# Patient Record
Sex: FEMALE
[De-identification: names, ages, dates, MRNs, and addresses within clinical notes are randomized per-mention and may not be internally consistent; named-entity substitution may affect disease eponyms.]

---

## 2023-02-07 PROBLEM — Z00.00 ENCOUNTER FOR PREVENTIVE HEALTH EXAMINATION: Status: ACTIVE | Noted: 2023-02-07

## 2023-02-08 ENCOUNTER — APPOINTMENT (OUTPATIENT)
Dept: OTOLARYNGOLOGY | Facility: CLINIC | Age: 53
End: 2023-02-08
Payer: COMMERCIAL

## 2023-02-08 VITALS
SYSTOLIC BLOOD PRESSURE: 125 MMHG | WEIGHT: 132 LBS | DIASTOLIC BLOOD PRESSURE: 77 MMHG | HEIGHT: 65 IN | OXYGEN SATURATION: 98 % | TEMPERATURE: 98 F | RESPIRATION RATE: 16 BRPM | BODY MASS INDEX: 21.99 KG/M2 | HEART RATE: 60 BPM

## 2023-02-08 DIAGNOSIS — Z86.39 PERSONAL HISTORY OF OTHER ENDOCRINE, NUTRITIONAL AND METABOLIC DISEASE: ICD-10-CM

## 2023-02-08 DIAGNOSIS — Z78.9 OTHER SPECIFIED HEALTH STATUS: ICD-10-CM

## 2023-02-08 DIAGNOSIS — H91.90 UNSPECIFIED HEARING LOSS, UNSPECIFIED EAR: ICD-10-CM

## 2023-02-08 PROCEDURE — 99204 OFFICE O/P NEW MOD 45 MIN: CPT | Mod: 25

## 2023-02-08 PROCEDURE — 69210 REMOVE IMPACTED EAR WAX UNI: CPT

## 2023-02-08 PROCEDURE — 92557 COMPREHENSIVE HEARING TEST: CPT

## 2023-02-08 PROCEDURE — 92550 TYMPANOMETRY & REFLEX THRESH: CPT | Mod: 52

## 2023-02-08 NOTE — PHYSICAL EXAM
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

## 2023-02-08 NOTE — DATA REVIEWED
[de-identified] : outside  on 19 showed l hf snhl, r mid to high frequency snhl -results reviewed with pt \par outside  on 23 showed left snhl, low frequencies decreased when compared with 19, r snhl- stable when compared with 19 -results reviewed with pt \par  today showed r hf snhl, l low frequency and high frequency snhl- stable when compared with 23 -results reviewed with pt

## 2023-02-08 NOTE — HISTORY OF PRESENT ILLNESS
[de-identified] : 53 y/o F presenting with a fullness in her right ear, fluctuations in hearing in her right ear, referred by Dr Marie. She has h/o Hashimoto's hypothyroidism. 3 years ago she noticed a discrepancy between right and left ears. She saw an otolaryngologist and had a  and was told she had a discrepancy between both ears. She had an MRI and was told it was nl in 2019 (she showed us report on computer- report unremarkable). She had an episode of dizziness for 30 minutes associated with high-pitched noise in her right ear, and r aural fullness, and her right hearing dropped for 30 minutes and then returned to baseline. She had shingles vaccine approximately 19 days ago and was ill for 48 hours afterward- she was nauseated and dizzy. Throughout the day she has instances of intermittent fullness in her right ear, and fluctuations in hearing in her right ear. Prior to fluctuations in hearing she hears a high pitched tone. She also experiences episodes of dizziness when she exercises and lies on her back. She does not notice a change in hearing in her left ear. She had COVID in 2022. She denies recent uri, COVID, flu. She denies tick bite or recent Lyme's Disease that she is aware of. No fh or sh relevant to cc.

## 2023-02-08 NOTE — REASON FOR VISIT
[Initial Evaluation] : an initial evaluation for [FreeTextEntry2] : r aural fulness, fluctuations in hearing, tinnitus

## 2023-02-08 NOTE — PROCEDURE
[Cerumen Impaction] : Cerumen Impaction [Same] : same as the Pre Op Dx. [] : Removal of Cerumen [FreeTextEntry5] : r nl, l copious cerumen removed atraumatically with suction, b TMs nl

## 2023-02-08 NOTE — ASSESSMENT
[FreeTextEntry1] : 1. r fullness in ear, tinnitus, fluctuation in r hearing, l sudden hearing loss r hf snhl, l lf and hf snhl possibly d/t Meniere's Disease\par -outside  on 19 showed l hf snhl, r mid to high frequency snhl -results reviewed with pt \par -outside  on 23 showed left snhl, low frequencies decreased when compared with 19, r snhl- stable when compared with 19 -results reviewed with pt \par - today showed r hf snhl, l low frequency and high frequency snhl- stable when compared with 23 -results reviewed with pt \par -lo sodium diet- 1500 mg, advised to stay well hydrated in case this is Meniere's Disease\par -maxzide  tab \par -LEWIS, HSP 70, Lymes, CBC, CMP, dsDNA, RF, sed rate, FTA will call pt with results \par -holding off on steroids (pt had shingles vaccine 19 days ago an could blunt her immune response) and she does not notice left hearing loss\par -she had MRI in 2019- recommended repeating it \par 2. l cerumen impaction \par -cerumen removed\par -ears felt better\par RTC in 12-14 days with MRI --> consider steroids at next visit \par \par

## 2023-02-09 LAB
ALBUMIN SERPL ELPH-MCNC: 4.4 G/DL
ALP BLD-CCNC: 33 U/L
ALT SERPL-CCNC: 17 U/L
ANION GAP SERPL CALC-SCNC: 11 MMOL/L
AST SERPL-CCNC: 20 U/L
B BURGDOR AB SER-IMP: NEGATIVE
B BURGDOR IGG+IGM SER QL: 0.21 INDEX
BASOPHILS # BLD AUTO: 0.06 K/UL
BASOPHILS NFR BLD AUTO: 0.8 %
BILIRUB SERPL-MCNC: 0.3 MG/DL
BUN SERPL-MCNC: 15 MG/DL
CALCIUM SERPL-MCNC: 9.4 MG/DL
CHLORIDE SERPL-SCNC: 103 MMOL/L
CO2 SERPL-SCNC: 24 MMOL/L
CREAT SERPL-MCNC: 1.04 MG/DL
DSDNA AB SER-ACNC: <12 IU/ML
EGFR: 65 ML/MIN/1.73M2
EOSINOPHIL # BLD AUTO: 0.13 K/UL
EOSINOPHIL NFR BLD AUTO: 1.8 %
ERYTHROCYTE [SEDIMENTATION RATE] IN BLOOD BY WESTERGREN METHOD: 6 MM/HR
GLUCOSE SERPL-MCNC: 95 MG/DL
HCT VFR BLD CALC: 41.1 %
HGB BLD-MCNC: 13.2 G/DL
IMM GRANULOCYTES NFR BLD AUTO: 0.3 %
LYMPHOCYTES # BLD AUTO: 2.12 K/UL
LYMPHOCYTES NFR BLD AUTO: 29.6 %
MAN DIFF?: NORMAL
MCHC RBC-ENTMCNC: 28.8 PG
MCHC RBC-ENTMCNC: 32.1 GM/DL
MCV RBC AUTO: 89.5 FL
MONOCYTES # BLD AUTO: 0.43 K/UL
MONOCYTES NFR BLD AUTO: 6 %
NEUTROPHILS # BLD AUTO: 4.4 K/UL
NEUTROPHILS NFR BLD AUTO: 61.5 %
PLATELET # BLD AUTO: 208 K/UL
POTASSIUM SERPL-SCNC: 4 MMOL/L
PROT SERPL-MCNC: 6.7 G/DL
RBC # BLD: 4.59 M/UL
RBC # FLD: 12.8 %
RHEUMATOID FACT SER QL: <10 IU/ML
SODIUM SERPL-SCNC: 138 MMOL/L
T PALLIDUM AB SER QL IA: NEGATIVE
WBC # FLD AUTO: 7.16 K/UL

## 2023-02-15 ENCOUNTER — NON-APPOINTMENT (OUTPATIENT)
Age: 53
End: 2023-02-15

## 2023-02-15 LAB
ANA PAT FLD IF-IMP: NORMAL
ANA SER IF-ACNC: ABNORMAL

## 2023-02-17 ENCOUNTER — NON-APPOINTMENT (OUTPATIENT)
Age: 53
End: 2023-02-17

## 2023-02-17 LAB — INNER EAR 68KD AB FLD QL: NEGATIVE

## 2023-02-23 ENCOUNTER — APPOINTMENT (OUTPATIENT)
Dept: OTOLARYNGOLOGY | Facility: CLINIC | Age: 53
End: 2023-02-23
Payer: COMMERCIAL

## 2023-02-23 VITALS
HEART RATE: 68 BPM | OXYGEN SATURATION: 100 % | HEIGHT: 65 IN | WEIGHT: 132 LBS | DIASTOLIC BLOOD PRESSURE: 75 MMHG | BODY MASS INDEX: 21.99 KG/M2 | SYSTOLIC BLOOD PRESSURE: 123 MMHG | RESPIRATION RATE: 16 BRPM | TEMPERATURE: 97 F

## 2023-02-23 PROCEDURE — 92550 TYMPANOMETRY & REFLEX THRESH: CPT | Mod: 52

## 2023-02-23 PROCEDURE — 99214 OFFICE O/P EST MOD 30 MIN: CPT

## 2023-02-23 PROCEDURE — 92557 COMPREHENSIVE HEARING TEST: CPT

## 2023-02-23 NOTE — DATA REVIEWED
[de-identified] :  showed b snhl- r WR decreased from 100% to 62%, l mid frequencies decreased when compared with 23 -results reviewed with pt  [de-identified] : bloodwork revealed abnl LEWIS, otherwise unremarkable -results reviewed with pt

## 2023-02-23 NOTE — REASON FOR VISIT
[Subsequent Evaluation] : a subsequent evaluation for [FreeTextEntry2] : r aural fullness, fluctuations in hearing, tinnitus

## 2023-02-23 NOTE — ASSESSMENT
[FreeTextEntry1] : r fullness in ear, tinnitus, fluctuation in r hearing, l sudden hearing loss possibly d/t b Meniere's Disease vs aied\par -bloodwork revealed abnl LEWIS, otherwise unremarkable -results reviewed with pt \par -discussed with Dr. Garcia- she offered to see her and asked patient to call her office tomorrow- she agreed\par -continue lo sodium diet- 1500 mg, hydration\par -continue maxzide 25 1 tab\par -open stand up MRI scheduled for  - recommended she have MRI sooner with a sedative; pt admits to claustrophobia- istop was checked, no prescriptions, 5 mg diazepam prescribed and recommended patient have someone accompany her to conventional  MRI- she agreed\par - showed b snhl- r WR decreased from 100% to 62%, l mid frequencies decreased when compared with 23 -results reviewed with pt \par -discussed risks and benefits and alts to po and b IT dexamethasone injxn- pt declined IT injxns and wished to try po steroids first- explained we would recommend both but she prefers to try po steroids only; she understood the faster it is treated, the better the chance of improvement in hearing\par -pt denies HTN, DM, PUD, infxn, depression/anxiety \par -prednisone\par -omeprazole \par RTC in 1 week with  and MRI or sooner for any issues\par

## 2023-02-23 NOTE — HISTORY OF PRESENT ILLNESS
[de-identified] : 15 day followup visit for this 53 y/o F with a fullness in her right ear, fluctuations in hearing in her right ear for several years, which are worse in the past month. Since last visit she has been following a lo sodium diet- 1500 mg and is on maxzide 1/2 tab. She had blood work which revealed a positive LEWIS. We recommended she see Dr. Garcia and has reached out to their office to set up an appointment. Since last visit she denies any episodes of dizziness. She still has fluctuations in r hearing and fullness in her right ear, but states instances are less frequent. At last visit she was found to have r hf snhl, and l lf snhl. She had shingles vaccine approximately 1 month ago. Patient is set up for open stand up MRI at the end of March.

## 2023-03-01 ENCOUNTER — APPOINTMENT (OUTPATIENT)
Dept: OTOLARYNGOLOGY | Facility: CLINIC | Age: 53
End: 2023-03-01
Payer: COMMERCIAL

## 2023-03-01 VITALS
BODY MASS INDEX: 21.99 KG/M2 | HEART RATE: 78 BPM | DIASTOLIC BLOOD PRESSURE: 74 MMHG | HEIGHT: 65 IN | WEIGHT: 132 LBS | SYSTOLIC BLOOD PRESSURE: 114 MMHG | OXYGEN SATURATION: 98 % | TEMPERATURE: 98 F

## 2023-03-01 PROCEDURE — 92550 TYMPANOMETRY & REFLEX THRESH: CPT | Mod: 52

## 2023-03-01 PROCEDURE — 92557 COMPREHENSIVE HEARING TEST: CPT

## 2023-03-01 PROCEDURE — 99214 OFFICE O/P EST MOD 30 MIN: CPT

## 2023-03-01 NOTE — REASON FOR VISIT
[Subsequent Evaluation] : a subsequent evaluation for [FreeTextEntry2] : b aural fullness, fluctuations in hearing

## 2023-03-01 NOTE — DATA REVIEWED
[de-identified] :  showed r snhl-  decreased  mid [facility] [place] compared with 23, l low frequency snhl- decreased when compared with 23, WR- R-64%, L-100% -results reviewed with pt  [de-identified] : MRI showed chronic T2 white matter changes, developmental venous anomaly, otherwise unremarkable -results reviewed with pt

## 2023-03-01 NOTE — HISTORY OF PRESENT ILLNESS
[de-identified] : 6 day followup visit for this 51 y/o F with r aural fullness, fluctuations in hearing in her right ear for several years, which are worse in the past month. She is following a low sodium diet and is on maxzide 1/2 tab daily. She is on oral prednisone and omeprazole. She is here to recheck her hearing and MRI. Approximately 5 days ago she noticed a fullness in her left ear and fluctuations in l hearing. When she had fullness in left ear she was laying on couch, was nauseated, and was dizzy. She reports the room was spinning and it lasted a few hours and she needed to sleep it off.

## 2023-03-01 NOTE — ASSESSMENT
[FreeTextEntry1] : b aural fullness, fluctuations in hearing, tinnitus likely d/t b Meniere's Disease vs aied\par - showed r snhl- slightly decreased when compared with 23, l low frequency snhl- decreased when compared with 23, WR- R-64%, L-100% -results reviewed with pt \par -MRI showed chronic T2 white matter changes, developmental venous anomaly, otherwise unremarkable -results reviewed with pt \par -continue lo sodium diet- 1500 mg, hydration\par -continue diuretic \par -finish po steroids and omeprazole \par -discussed risks and benefits and alts to b IT dexamethasone injxns- she said she is flying tomorrow, recommended she postpone trip- she said she could not and would come back in 5 days- she understood the faster it is treated, the better the chance of improvement in hearing\par -she has appointment in mid March with Dr. Garcia\par RTC in 5 days when she is back in town

## 2023-03-06 ENCOUNTER — APPOINTMENT (OUTPATIENT)
Dept: OTOLARYNGOLOGY | Facility: CLINIC | Age: 53
End: 2023-03-06
Payer: COMMERCIAL

## 2023-03-06 PROCEDURE — 99214 OFFICE O/P EST MOD 30 MIN: CPT | Mod: 25

## 2023-03-06 PROCEDURE — 69801 INCISE INNER EAR: CPT | Mod: 50

## 2023-03-06 PROCEDURE — 92557 COMPREHENSIVE HEARING TEST: CPT

## 2023-03-06 PROCEDURE — 92550 TYMPANOMETRY & REFLEX THRESH: CPT | Mod: 52

## 2023-03-06 NOTE — PROCEDURE
[Risk and Benefits Discussed] : The purpose, risks, discomforts, benefits and alternatives of the procedure have been explained to the patient including no treatment. [[facility] [[facility]den [facility] Loss] : Sudden Hearing Loss [Same] : same as the Pre Op Dx. [] : Intratympanic Therapy [FreeTextEntry4] : phenol [FreeTextEntry6] : 0.3 cc dex injected AU atraumatically IT under microscope - lay down 20 min after each sequential injx

## 2023-03-06 NOTE — HISTORY OF PRESENT ILLNESS
[de-identified] : 5 day followup visit for this 51 y/o F with r aural fullness, fluctuations in hearing in her right ear for years, which are worse in the past month. She is following a lo sodium diet and is on maxzide daily. She also has had sudden hl in left ear recently. She feels her r ear fluctuates more than the left does. She is here to recheck her hearing and consider steroids injection. . She finished a course of po steroids without improvement. She had ocean like tinnitus 3 nights ago and has had intermittent noise.

## 2023-03-06 NOTE — ASSESSMENT
[FreeTextEntry1] : b aural fullness, fluctuations in hearing, sudden tinnitus likely d/t b Meniere's Disease vs aied\par -s/p po steroids and omeprazole - did not have significant improvement- r worsened\par -continue lo sodium diet- 1500 mg, hydration\par -continue diuretic \par discussed risks benefits and alts to B  IT dexamethasone injx\par she wished to proceed\par done\par dep\par drops 2d\par has appt to see Dr Garcia in [place] next week\par rtc 1 week with alan

## 2023-03-13 ENCOUNTER — APPOINTMENT (OUTPATIENT)
Dept: OTOLARYNGOLOGY | Facility: CLINIC | Age: 53
End: 2023-03-13
Payer: COMMERCIAL

## 2023-03-13 VITALS
RESPIRATION RATE: 16 BRPM | HEIGHT: 65 IN | WEIGHT: 130 LBS | DIASTOLIC BLOOD PRESSURE: 77 MMHG | HEART RATE: 67 BPM | BODY MASS INDEX: 21.66 KG/M2 | OXYGEN SATURATION: 100 % | SYSTOLIC BLOOD PRESSURE: 114 MMHG | TEMPERATURE: 98 F

## 2023-03-13 DIAGNOSIS — H93.8X1 OTHER SPECIFIED DISORDERS OF RIGHT EAR: ICD-10-CM

## 2023-03-13 PROCEDURE — 92550 TYMPANOMETRY & REFLEX THRESH: CPT

## 2023-03-13 PROCEDURE — 69801 INCISE INNER EAR: CPT | Mod: 50

## 2023-03-13 PROCEDURE — 92557 COMPREHENSIVE HEARING TEST: CPT

## 2023-03-13 PROCEDURE — 99213 OFFICE O/P EST LOW 20 MIN: CPT | Mod: 25

## 2023-03-13 NOTE — REASON FOR VISIT
[Subsequent Evaluation] : a subsequent evaluation for [FreeTextEntry2] : b aural fullness, fluctuations in hearing, vertigo, b md vs aied

## 2023-03-13 NOTE — HISTORY OF PRESENT ILLNESS
[de-identified] : 1 week followup visit for this 52 y/o F with r aural fullness, fluctuations in hearing in her right ear which are worse for the past month. She is following a lo sodium diet and is on maxzide daily. She has had sudden fluctuations in left ear. She was treated with po steroids and had b intratympanic dexamethasone injections. She is here to recheck her hearing. Since last visit she has ocean like tinnitus in her left ear which began 1-2 d after injx. She does not notice any changes in hearing. She is c/o dizziness for the past five days, and four days ago was dizzy and nauseated to the point where she felt she could not walk. She is currently mildly unsteady

## 2023-03-13 NOTE — PROCEDURE
[Risk and Benefits Discussed] : The purpose, risks, discomforts, benefits and alternatives of the procedure have been explained to the patient including no treatment. [[facility] [Sudden Hearing Loss] : Sudden Hearing Loss [Same] : same as the Pre Op Dx. [] : Intratympanic Therapy [FreeTextEntry4] : phenol  [FreeTextEntry5] : 0.3 cc Dexamethasone injected bilaterally IT atraumatically under microscope

## 2023-03-13 NOTE — ASSESSMENT
[FreeTextEntry1] : b aural fullness, fluctuations in hearing, sudden tinnitus likely d/t b Meniere's Disease vs aied\par - showed r hf snhl, l lf snhl- stable when compared with 3/6/23 - results reviewed with pt\par -she has followup with Dr. aGrcia in 2 days\par -s/p po steroids and omeprazole\par -continue lo sodium diet- 1500 mg, hydration\par -continue diuretic\par -discussed risks, benefits, and alternatives to another round of b IT dexamethasone injxns- she has had one round thus far\par -she wished to proceed\par -done\par -dep\par -ofloxacin drops x 2 days\par -recommended vestibular therapy- given script and provided list of facilities \par -she has upcoming flight Saturday- recommend she postpones this trip and asked her to come in the day before- she agreed\par RTC in 5 days with

## 2023-03-13 NOTE — PHYSICAL EXAM
[de-identified] : r tm healed, l monomer  [Normal] : no nystagmus [de-identified] : gait steady

## 2023-03-13 NOTE — DATA REVIEWED
[de-identified] :  showed r hf snhl, l lf snhl- stable when compared with 3/6/23 - results reviewed with pt

## 2023-03-17 ENCOUNTER — APPOINTMENT (OUTPATIENT)
Dept: OTOLARYNGOLOGY | Facility: CLINIC | Age: 53
End: 2023-03-17
Payer: COMMERCIAL

## 2023-03-17 VITALS
SYSTOLIC BLOOD PRESSURE: 122 MMHG | DIASTOLIC BLOOD PRESSURE: 77 MMHG | RESPIRATION RATE: 16 BRPM | HEIGHT: 65 IN | HEART RATE: 65 BPM | WEIGHT: 130 LBS | OXYGEN SATURATION: 100 % | TEMPERATURE: 98.2 F | BODY MASS INDEX: 21.66 KG/M2

## 2023-03-17 PROCEDURE — 69801 INCISE INNER EAR: CPT | Mod: 50

## 2023-03-17 PROCEDURE — 92557 COMPREHENSIVE HEARING TEST: CPT

## 2023-03-17 PROCEDURE — 99214 OFFICE O/P EST MOD 30 MIN: CPT | Mod: 25

## 2023-03-18 NOTE — ASSESSMENT
[FreeTextEntry1] : b aural fullness, fluctuations in hearing, sudden tinnitus is likely d/t b Meniere's Disease vs aied\par - showed b snhl- improvement in b pure tone, WR increased from 80% to 90% in r, l 100% -results reviewed with pt \par -s/p po steroids and omeprazole\par -continue to followup with Dr. Garcia; they are awaiting decision on rituximab from insurance\par -continue lo sodium diet-1500 mg, hydration\par -continue diuretic, colchicine, anakinra\par -pt is scheduled for vestibular rehab\par -discussed risks and benefits and alts to  b IT dexamethasone injxns- pt has had two on each side thus far\par -pt wished to proceed\par -done\par -dep\par -ofloxacin 2 days\par RTC in 5 days with rpt  or sooner as needed

## 2023-03-18 NOTE — PROCEDURE
[facility] [Same] : same as the Pre Op Dx. [] : Intratympanic Therapy [Risk and Benefits Discussed] : The purpose, risks, discomforts, benefits and alternatives of the procedure have been explained to the patient including no treatment. [Sudden Hearing Loss] : Sudden Hearing Loss [FreeTextEntry4] : phenol [FreeTextEntry5] : 0.3 cc Dexamethasone injected bilaterally IT atraumatically under microscope

## 2023-03-18 NOTE — REASON FOR VISIT
[Subsequent Evaluation] : a subsequent evaluation for [FreeTextEntry2] : b aural fullness, fluctuations in hearing, vertigo, b Meniere's disease vs aied

## 2023-03-18 NOTE — HISTORY OF PRESENT ILLNESS
[de-identified] : 4 day followup visit for this 52 y/o F with r aural fullness, fluctuations in hearing bilaterally which are worse for the past month. She was treated with po steroids and b intratympanic dexamethasone injxns. She is on a lo sodium diet and is on maxzide daily. She is here to recheck her hearing. She feels l tinnitus has improved. She is c/o dizziness and is scheduled for vestibular therapy next week. She saw Dr. 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 She has been on colchicine and anakinra for the past 3 days.

## 2023-03-18 NOTE — DATA REVIEWED
[de-identified] :  showed b snhl- improvement in b pure tone, WR increased from 80% to 90% in r, l 100% -results reviewed with pt

## 2023-03-23 ENCOUNTER — APPOINTMENT (OUTPATIENT)
Dept: OTOLARYNGOLOGY | Facility: CLINIC | Age: 53
End: 2023-03-23
Payer: COMMERCIAL

## 2023-03-23 VITALS
SYSTOLIC BLOOD PRESSURE: 123 MMHG | TEMPERATURE: 98 F | HEART RATE: 85 BPM | BODY MASS INDEX: 21.66 KG/M2 | OXYGEN SATURATION: 97 % | HEIGHT: 65 IN | WEIGHT: 130 LBS | DIASTOLIC BLOOD PRESSURE: 77 MMHG

## 2023-03-23 PROCEDURE — 92557 COMPREHENSIVE HEARING TEST: CPT

## 2023-03-23 PROCEDURE — 99213 OFFICE O/P EST LOW 20 MIN: CPT | Mod: 25

## 2023-03-23 PROCEDURE — 69801 INCISE INNER EAR: CPT | Mod: LT

## 2023-03-23 NOTE — PHYSICAL EXAM
[de-identified] : b pinhole perforations  [Normal] : no nystagmus [de-identified] : gait steady

## 2023-03-23 NOTE — DATA REVIEWED
[de-identified] :  showed b snhl- r hf snhl- stable, l lf snhl, pure tones decreased when compared with 3/12/23 - results reviewed with pt

## 2023-03-23 NOTE — REASON FOR VISIT
[Subsequent Evaluation] : a subsequent evaluation for [FreeTextEntry2] : b aural fullness, fluctuations in hearing, vertigo, b aied with features of B MD

## 2023-03-23 NOTE — PROCEDURE
[Risk and Benefits Discussed] : The purpose, risks, discomforts, benefits and alternatives of the procedure have been explained to the patient including no treatment. [[facility] [[facility]] : Sudden Hearing Loss [Same] : same as the Pre Op Dx. [] : Intratympanic Therapy [FreeTextEntry5] : 0.3 cc Dexamethasone injected AS thru pinhole perforation IT atraumatically under microscope

## 2023-03-23 NOTE — HISTORY OF PRESENT ILLNESS
[de-identified] : 6 day followup visit for this 54 y/o F with r aural fullness, fluctuations in hearing bilaterally which are worse for the past month. She was treated with po steroids and b intratympanic dexamethasone injxns- she has had three on each side thus far. After most recent round of injections she felt worse after a day. She is on a lo sodium diet and is on maxzide daily. She is here to recheck her hearing. She feels l tinnitus has worsened, and is c/o "head heaviness." She is c/o dizziness every time she walks and is scheduled for vestibular therapy today. 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 Patient was tearful at beginning of today's visit due to loss of balance and increased tinnitus.

## 2023-03-23 NOTE — ASSESSMENT
[FreeTextEntry1] : b aural fullness, fluctuations in hearing, sudden tinnitus is likely d/t b Meniere's Disease vs aied\par - showed b snhl- r hf snhl- stable, l lf snhl, pure tones decreased when compared with 3/12/23 - results reviewed with pt \par -s/p po steroids and omeprazole\par -continue to followup with Dr. Garcia; they are awaiting decision on rituximab from insurance\par -continue lo sodium diet- 1500 mg, hydration\par -continue diuretic, colchicine, awaiting anakinra\par -pt is scheduled for vestibular rehab today\par -discussed risks, benefits, and alternatives to another round of po steroids and left IT dexamethasone injxns- pt has had three in each ear thus far\par -pt denies HTN, DM, PUD, infxn, depression/anxiety \par -po prednisone\par -omeprazole\par -pt wished to proceed\par -done\par -dep \par -ofloxacin 2 days\par RTC in 5 days with ; sooner for any issues\par I texted Dr Garcia to see if she had other recommendations - she did not - just steroids

## 2023-03-29 ENCOUNTER — APPOINTMENT (OUTPATIENT)
Dept: OTOLARYNGOLOGY | Facility: CLINIC | Age: 53
End: 2023-03-29
Payer: COMMERCIAL

## 2023-03-29 DIAGNOSIS — H91.23 SUDDEN IDIOPATHIC HEARING LOSS, BILATERAL: ICD-10-CM

## 2023-03-29 PROCEDURE — 99214 OFFICE O/P EST MOD 30 MIN: CPT

## 2023-03-29 PROCEDURE — 92557 COMPREHENSIVE HEARING TEST: CPT

## 2023-03-29 RX ORDER — MECLIZINE HYDROCHLORIDE 25 MG/1
25 TABLET ORAL 3 TIMES DAILY
Qty: 20 | Refills: 1 | Status: ACTIVE | COMMUNITY
Start: 2023-03-29 | End: 1900-01-01

## 2023-03-29 NOTE — PHYSICAL EXAM
[de-identified] : r pinhole perf, partly healed l tm nl  [Normal] : no nystagmus [de-identified] : gait steady

## 2023-03-29 NOTE — DATA REVIEWED
[de-identified] :  showed r hf snhl- decreased when compared with 3/23/23, l lf snhl- improved when compared with 3/23/23 -results reviewed with pt

## 2023-03-29 NOTE — REASON FOR VISIT
[Subsequent Evaluation] : a subsequent evaluation for [FreeTextEntry2] : b aural fullness, fluctuations in hearing, vertigo, b aied with features of b Meniere's Disease

## 2023-03-29 NOTE — ASSESSMENT
[FreeTextEntry1] : b aural fullness, fluctuations in hearing, sudden tinnitus is likely d/t b Meniere's Disease vs aied, vertigo\par -VNG\par -fistula test\par - showed r hf snhl- decreased when compared with 3/23/23, l lf snhl- improved when compared with 3/23/23 -results reviewed with pt \par -finish po steroids and omeprazole\par -s/p 4 rounds of IT dexamethasone injxns in left ear, 3 rounds of IT dexamethasone in right ear\par -continue lo sodium diet- 1500 mg, stay well hydrated \par -continue diuretic, colchicine, awaiting anakinra\par -for r ear- recommended dep \par -reassured pt can fly- recommended Afrin 30 minutes prior to take off, and ear planes in left ear only\par -given medrol dose pack as an emergency while she is away \par -meclizine for extreme episodes of dizziness \par -continue vestibular therapy \par RTC with VNG and fistula test when back in [facility], await Anakinra

## 2023-03-29 NOTE — HISTORY OF PRESENT ILLNESS
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 She was treated with two courses of po steroids and b intratympanic dexamethasone injxns- pt has had four injxns in her left ear, and three in her right ear. She is on a lo sodium diet and is on maxzide daily. She is here to recheck her hearing. She feels tinnitus is about the same and has symptoms of dizziness. She feels r hearing has gotten worse but l has improved. . She is seeing Dr. Garcia and is on colchicine, she is awaiting anakinra. She is about to take a trip to [place] to visit her daughter.

## 2023-04-10 ENCOUNTER — APPOINTMENT (OUTPATIENT)
Dept: OTOLARYNGOLOGY | Facility: CLINIC | Age: 53
End: 2023-04-10

## 2023-04-13 ENCOUNTER — APPOINTMENT (OUTPATIENT)
Dept: OTOLARYNGOLOGY | Facility: CLINIC | Age: 53
End: 2023-04-13
Payer: COMMERCIAL

## 2023-04-13 VITALS
BODY MASS INDEX: 21.66 KG/M2 | SYSTOLIC BLOOD PRESSURE: 119 MMHG | OXYGEN SATURATION: 100 % | RESPIRATION RATE: 16 BRPM | TEMPERATURE: 97.8 F | HEART RATE: 75 BPM | DIASTOLIC BLOOD PRESSURE: 72 MMHG | WEIGHT: 130 LBS | HEIGHT: 65 IN

## 2023-04-13 DIAGNOSIS — H91.22 SUDDEN IDIOPATHIC HEARING LOSS, LEFT EAR: ICD-10-CM

## 2023-04-13 PROCEDURE — 92550 TYMPANOMETRY & REFLEX THRESH: CPT | Mod: 52

## 2023-04-13 PROCEDURE — 69801 INCISE INNER EAR: CPT | Mod: LT

## 2023-04-13 PROCEDURE — 92557 COMPREHENSIVE HEARING TEST: CPT

## 2023-04-13 PROCEDURE — 99214 OFFICE O/P EST MOD 30 MIN: CPT | Mod: 25

## 2023-04-13 NOTE — PROCEDURE
[Risk and Benefits Discussed] : The purpose, risks, discomforts, benefits and alternatives of the procedure have been explained to the patient including no treatment. [NHL[facility] [Sudden Hearing Loss] : Sudden Hearing Loss [Same] : same as the Pre Op Dx. [] : Intratympanic Therapy [FreeTextEntry4] : phenol  [FreeTextEntry5] : 0.3 cc Dexamethasone injected AS IT atraumatically under microscope

## 2023-04-13 NOTE — ASSESSMENT
[FreeTextEntry1] : b aural fullness, fluctuations in hearing, sudden tinnitus is likely d/t b Meniere's Disease vs aied, vertigo\par -VNG\par -fistula test\par - showed r hf snhl- pure tones stable compared with 3/27/23, l lf snhl- decreased when compared with 3/29/23, WR- R 90%, L WR decreased from 100%to 90% -results reviewed with pt \par -s/p po steroids\par -discussed risks and benefits and alts to left IT dexamethasone injxn\par -pt wished to proceed\par -done\par she recently completed course of prednisone\par -dep\par -continue vestibular therapy\par -continue lo sodium diet- 1500 mg, hydration\par -continue diuretic, colchicine, awaiting anakinra \par RTC in 5 days with , VNG, fistula test

## 2023-04-13 NOTE — HISTORY OF PRESENT ILLNESS
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 She finished recent course of po steroids. She has been treated with IT dexamethasone injxns. She is on maxzide daily and is following a lo sodium diet. She feels tinnitus in left ear is worse. She is c/o dizziness and is scheduled for VNG and fistula test in two weeks. She is seeing Dr. Garcia and is on colchicine, she is awaiting anakinra. Dr Garcia was contacted and she related they were appealing ins co denial.

## 2023-04-13 NOTE — DATA REVIEWED
[de-identified] :  showed r hf snhl- pure tones stable compared with 3/27/23, l lf snhl- decreased when compared with 3/29/23, WR- R 90%, L WR decreased from 100%to 90% -results reviewed with pt

## 2023-04-21 ENCOUNTER — APPOINTMENT (OUTPATIENT)
Dept: OTOLARYNGOLOGY | Facility: CLINIC | Age: 53
End: 2023-04-21
Payer: COMMERCIAL

## 2023-04-21 VITALS
BODY MASS INDEX: 21.66 KG/M2 | HEART RATE: 68 BPM | OXYGEN SATURATION: 100 % | WEIGHT: 130 LBS | HEIGHT: 65 IN | TEMPERATURE: 97.9 F | SYSTOLIC BLOOD PRESSURE: 114 MMHG | DIASTOLIC BLOOD PRESSURE: 77 MMHG

## 2023-04-21 PROCEDURE — 99214 OFFICE O/P EST MOD 30 MIN: CPT

## 2023-04-21 PROCEDURE — 92557 COMPREHENSIVE HEARING TEST: CPT

## 2023-04-21 NOTE — HISTORY OF PRESENT ILLNESS
[de-identified] : 8 day followup visit for this 52 y/o F with aied, b Meniere's symptoms, r aural fullness, fluctuations in hearing bilaterally which are worse for the past month. She is on maxzide daily and is following lo sodium diet.  At last visit she was found to have decrease in l hearing. She took po steroids and had 1 IT dexamethasone injxn. She is here to recheck her hearing and feels it is stable. She is scheduled for VNG and fistula test next week. She is seeing Dr. Garcia and is on colchicine, she is awaiting anakinra. She reports that the few days that she ran out of colchicine her dizziness improved; when restarted, it worsened.

## 2023-04-21 NOTE — ASSESSMENT
[FreeTextEntry1] : b aural fullness, fluctuations in hearing, sudden tinnitus is likely d/t b Meniere's Disease vs aied, vertigo\par -VNG and fistula test scheduled for next week\par - showed r hf snhl, l lf snhl- stable when compared with 23 which was only a mild decrease -results reviewed with pt \par -s/p po prednisone, and one left IT dexamethasone injxn\par -continue vestibular therapy\par -continue lo sodium diet- 1500 mg, hydration\par -continue diuretic\par -recommended she discuss effect of colchicine with Dr. Garcia as it is making her dizzy, awaiting anakinra, also texted Dr. Garcia- she responded that if hearing is stable colchicine may be working and recommended continuing as is, if tolerable. She said she would check with her office to see if ins approved Anakinra\par RTC with VNG, fistula test to review findings

## 2023-04-21 NOTE — DATA REVIEWED
[de-identified] :  showed r hf snhl, l lf snhl- stable when compared with 23 -results reviewed with pt

## 2023-04-25 ENCOUNTER — APPOINTMENT (OUTPATIENT)
Dept: OTOLARYNGOLOGY | Facility: CLINIC | Age: 53
End: 2023-04-25
Payer: COMMERCIAL

## 2023-04-25 VITALS
RESPIRATION RATE: 16 BRPM | HEART RATE: 62 BPM | TEMPERATURE: 97.8 F | SYSTOLIC BLOOD PRESSURE: 110 MMHG | DIASTOLIC BLOOD PRESSURE: 74 MMHG | WEIGHT: 130 LBS | HEIGHT: 65 IN | OXYGEN SATURATION: 100 % | BODY MASS INDEX: 21.66 KG/M2

## 2023-04-25 PROCEDURE — 92540 BASIC VESTIBULAR EVALUATION: CPT

## 2023-04-25 PROCEDURE — 92557 COMPREHENSIVE HEARING TEST: CPT

## 2023-04-25 PROCEDURE — 92537 CALORIC VSTBLR TEST W/REC: CPT

## 2023-04-25 PROCEDURE — 92550 TYMPANOMETRY & REFLEX THRESH: CPT | Mod: 52

## 2023-04-25 PROCEDURE — 99214 OFFICE O/P EST MOD 30 MIN: CPT

## 2023-04-26 NOTE — HISTORY OF PRESENT ILLNESS
[de-identified] : 4 day followup visit for this 54 y/o F with aied, b Meniere's symptoms, r aural fullness, fluctuations in hearing bilaterally, which are worse for the past month. She is on maxzide daily and is following a lo sodium diet. She is here to recheck her hearing and to review VNG, and fistula test. She is seeing Dr. Garcia and is on colchicine, she is awaiting insurance appeal by Dr Garcia to get Anakinra. She reports that since last visit her hearing has fluctuated several times a day, pressure in her ears l>r, and "roaring" l tinnitus.

## 2023-04-26 NOTE — DATA REVIEWED
[de-identified] :  showed r hf snhl, l lf snhl, % bilaterally- stable when compared with 23- results reviewed with pt \par fistula test nl, results reviewed with pt \par VNG revealed l unilateral vestibular weakness -results reviewed with pt

## 2023-04-26 NOTE — ASSESSMENT
[FreeTextEntry1] : b aural fullness, fluctuations in hearing, sudden tinnitus is likely d/t b Meniere's Disease vs aied, vertigo\par -fistula test nl, results reviewed with pt . Her disequilibrium is still bothersome and fluctuates throughout the day. \par -VNG revealed l unilateral vestibular weakness -results reviewed with pt \par -continue with vestibular therapy\par -continue lo sodium diet- 1500 mg, hydration\par -continue diuretic as is (BP is 110/74)\par - showed r hf snhl, l lf snhl, % bilaterally- stable when compared with 23- results reviewed with pt \par -she finished two courses of po prednisone and one left IT dexamethasone injxn\par -she feels worse with IT dexamethasone injxns and did not notice improvement with po steroids,continue colchicine\par -continue to followup with Dr. Garcia and continue with colchicine, awaiting anakinra\par RTC in 1 month with rpt ; call sooner with any issues

## 2023-04-26 NOTE — REASON FOR VISIT
[Subsequent Evaluation] : a subsequent evaluation for [FreeTextEntry2] : b aural fullness, fluctuations in hearing, vertigo, b aied, features of b Meniere's Disease

## 2023-05-24 ENCOUNTER — APPOINTMENT (OUTPATIENT)
Dept: OTOLARYNGOLOGY | Facility: CLINIC | Age: 53
End: 2023-05-24
Payer: COMMERCIAL

## 2023-05-24 PROCEDURE — 92557 COMPREHENSIVE HEARING TEST: CPT

## 2023-05-24 PROCEDURE — 92550 TYMPANOMETRY & REFLEX THRESH: CPT | Mod: 52

## 2023-05-24 PROCEDURE — 99214 OFFICE O/P EST MOD 30 MIN: CPT

## 2023-05-24 NOTE — ASSESSMENT
[FreeTextEntry1] : AIED \par l hearing with significant improvement - \par long discussion continue low sodium diet, hydration, diuretic as is, colchicine\par she is considering whether to see Dr Hutchison for kineret consideration - encouraged\par rtc 3 mo with alan; asked to call sooner for any problems

## 2023-05-24 NOTE — HISTORY OF PRESENT ILLNESS
[de-identified] : 1 mo follow up appt for this 52 yo f with probable aied, appearing as b Meniere's. She is on low sodium diet and max 25 half tab all she can tolerate due to low bp. She is hydrating. Per Dr Garcia's rec is on colchicine. She notes 2 weeks of improvement of l aural fullness and hl. Still has some hl. Denies vertigo.

## 2023-05-24 NOTE — DATA REVIEWED
[de-identified] : r downsloping snhl - no change; l lf hl resolved still has hf snhl reviewed with pt

## 2023-08-21 ENCOUNTER — NON-APPOINTMENT (OUTPATIENT)
Age: 53
End: 2023-08-21

## 2023-09-05 ENCOUNTER — APPOINTMENT (OUTPATIENT)
Dept: OTOLARYNGOLOGY | Facility: CLINIC | Age: 53
End: 2023-09-05
Payer: COMMERCIAL

## 2023-09-05 VITALS
WEIGHT: 130 LBS | OXYGEN SATURATION: 99 % | BODY MASS INDEX: 21.66 KG/M2 | TEMPERATURE: 97.9 F | DIASTOLIC BLOOD PRESSURE: 71 MMHG | HEART RATE: 68 BPM | SYSTOLIC BLOOD PRESSURE: 106 MMHG | HEIGHT: 65 IN

## 2023-09-05 DIAGNOSIS — H91.21 SUDDEN IDIOPATHIC HEARING LOSS, RIGHT EAR: ICD-10-CM

## 2023-09-05 DIAGNOSIS — H93.A1 PULSATILE TINNITUS, RIGHT EAR: ICD-10-CM

## 2023-09-05 PROCEDURE — 92550 TYMPANOMETRY & REFLEX THRESH: CPT | Mod: 52

## 2023-09-05 PROCEDURE — 69801 INCISE INNER EAR: CPT | Mod: RT

## 2023-09-05 PROCEDURE — 99214 OFFICE O/P EST MOD 30 MIN: CPT | Mod: 25

## 2023-09-05 PROCEDURE — 92557 COMPREHENSIVE HEARING TEST: CPT

## 2023-09-05 NOTE — DATA REVIEWED
[de-identified] :  revealed l snhl- stable, r hf snhl- pure tones decreased in mid pitches, WR decreased from 100% to 84% when compared with 23 - results reviewed with pt

## 2023-09-05 NOTE — HISTORY OF PRESENT ILLNESS
[de-identified] : 4 month followup visit for this 54 y/o F with probable aied also has characteristics of b Meniere's Disease. She is on low sodium diet, staying well hydrated, and is on maxzide 25 half tab as this is all she can tolerate due to low blood pressure. Per Dr. Garcia she is also on colchicine. She is here to recheck her hearing. She is c/o r aural fullness and r pulsatile tinnitus for the past 3 days. She still has episodic vertigo.

## 2023-09-05 NOTE — PROCEDURE
[Risk and Benefits Discussed] : The purpose, risks, discomforts, benefits and alternatives of the procedure have been explained to the patient including no treatment. [facility] [Sudden Hearing Loss] : Sudden Hearing Loss [Same] : same as the Pre Op Dx. [] : Intratympanic Therapy [FreeTextEntry4] : phenol  [FreeTextEntry5] : 0.3 cc Dexamethasone injected AD IT atraumatically under microscope

## 2023-09-05 NOTE — ASSESSMENT
[FreeTextEntry1] : b aied, r pulsatile tinnitus possibly d/t r sudden hearing loss - revealed l snhl- stable, r hf snhl- pure tones decreased in mid pitches, WR decreased from 100% to 84% when compared with 23 - results reviewed with pt -continue low sodium diet, hydration, diuretic as is, colchicine -discussed els -discussed risks and benefits and alts to po and IT dexamethasone injxn -no HTN, DM, PUD, infxn, or psych  -pt wished to proceed with po and IT corticosteroids -injection done -dep -prednisone -omeprazole  -ofloxacin 2 days RTC in 5 days with   text to Dr Garcia regarding other meds

## 2023-09-06 ENCOUNTER — NON-APPOINTMENT (OUTPATIENT)
Age: 53
End: 2023-09-06

## 2023-09-12 ENCOUNTER — APPOINTMENT (OUTPATIENT)
Dept: OTOLARYNGOLOGY | Facility: CLINIC | Age: 53
End: 2023-09-12
Payer: COMMERCIAL

## 2023-09-12 VITALS
WEIGHT: 130 LBS | HEART RATE: 71 BPM | TEMPERATURE: 97.8 F | SYSTOLIC BLOOD PRESSURE: 111 MMHG | OXYGEN SATURATION: 99 % | HEIGHT: 65 IN | BODY MASS INDEX: 21.66 KG/M2 | DIASTOLIC BLOOD PRESSURE: 73 MMHG

## 2023-09-12 PROCEDURE — 92557 COMPREHENSIVE HEARING TEST: CPT

## 2023-09-12 PROCEDURE — 99214 OFFICE O/P EST MOD 30 MIN: CPT | Mod: 25

## 2023-09-12 PROCEDURE — 69801 INCISE INNER EAR: CPT | Mod: RT

## 2023-09-14 ENCOUNTER — APPOINTMENT (OUTPATIENT)
Dept: OTOLARYNGOLOGY | Facility: CLINIC | Age: 53
End: 2023-09-14
Payer: COMMERCIAL

## 2023-09-14 VITALS
HEIGHT: 65 IN | DIASTOLIC BLOOD PRESSURE: 72 MMHG | SYSTOLIC BLOOD PRESSURE: 108 MMHG | OXYGEN SATURATION: 98 % | HEART RATE: 76 BPM | BODY MASS INDEX: 21.66 KG/M2 | TEMPERATURE: 98 F | WEIGHT: 130 LBS

## 2023-09-14 PROCEDURE — 99214 OFFICE O/P EST MOD 30 MIN: CPT | Mod: 25

## 2023-09-14 PROCEDURE — 69801 INCISE INNER EAR: CPT | Mod: LT

## 2023-09-14 PROCEDURE — 92557 COMPREHENSIVE HEARING TEST: CPT

## 2023-09-18 ENCOUNTER — APPOINTMENT (OUTPATIENT)
Dept: OTOLARYNGOLOGY | Facility: CLINIC | Age: 53
End: 2023-09-18
Payer: COMMERCIAL

## 2023-09-18 VITALS
WEIGHT: 130 LBS | HEIGHT: 65 IN | HEART RATE: 74 BPM | OXYGEN SATURATION: 99 % | SYSTOLIC BLOOD PRESSURE: 122 MMHG | DIASTOLIC BLOOD PRESSURE: 76 MMHG | TEMPERATURE: 98 F | BODY MASS INDEX: 21.66 KG/M2

## 2023-09-18 DIAGNOSIS — H90.A22 SENSORINEURAL HEARING LOSS, UNILATERAL, LEFT EAR, WITH RESTRICTED HEARING ON THE CONTRALATERAL SIDE: ICD-10-CM

## 2023-09-18 PROCEDURE — 92557 COMPREHENSIVE HEARING TEST: CPT

## 2023-09-18 PROCEDURE — 69801 INCISE INNER EAR: CPT | Mod: LT

## 2023-09-18 PROCEDURE — 99213 OFFICE O/P EST LOW 20 MIN: CPT | Mod: 25

## 2023-09-26 ENCOUNTER — NON-APPOINTMENT (OUTPATIENT)
Age: 53
End: 2023-09-26

## 2023-09-27 ENCOUNTER — APPOINTMENT (OUTPATIENT)
Dept: OTOLARYNGOLOGY | Facility: CLINIC | Age: 53
End: 2023-09-27
Payer: COMMERCIAL

## 2023-09-27 DIAGNOSIS — H91.21 SUDDEN IDIOPATHIC HEARING LOSS, RIGHT EAR: ICD-10-CM

## 2023-09-27 PROCEDURE — 92557 COMPREHENSIVE HEARING TEST: CPT

## 2023-09-27 PROCEDURE — 99214 OFFICE O/P EST MOD 30 MIN: CPT | Mod: 25

## 2023-09-27 PROCEDURE — 69801 INCISE INNER EAR: CPT | Mod: RT

## 2023-09-27 RX ORDER — ONDANSETRON 8 MG/1
8 TABLET, ORALLY DISINTEGRATING ORAL
Qty: 20 | Refills: 0 | Status: ACTIVE | COMMUNITY
Start: 2023-09-27 | End: 1900-01-01

## 2023-09-27 RX ORDER — MECLIZINE HYDROCHLORIDE 25 MG/1
25 TABLET ORAL 3 TIMES DAILY
Qty: 30 | Refills: 0 | Status: ACTIVE | COMMUNITY
Start: 2023-09-27 | End: 1900-01-01

## 2023-10-04 ENCOUNTER — APPOINTMENT (OUTPATIENT)
Dept: OTOLARYNGOLOGY | Facility: CLINIC | Age: 53
End: 2023-10-04
Payer: COMMERCIAL

## 2023-10-04 DIAGNOSIS — H91.22 SUDDEN IDIOPATHIC HEARING LOSS, LEFT EAR: ICD-10-CM

## 2023-10-04 DIAGNOSIS — H90.3 SENSORINEURAL HEARING LOSS, BILATERAL: ICD-10-CM

## 2023-10-04 PROCEDURE — 99214 OFFICE O/P EST MOD 30 MIN: CPT

## 2023-10-04 PROCEDURE — 92567 TYMPANOMETRY: CPT

## 2023-10-04 PROCEDURE — 92584 ELECTROCOCHLEOGRAPHY: CPT

## 2023-10-04 PROCEDURE — 92557 COMPREHENSIVE HEARING TEST: CPT

## 2023-10-04 RX ORDER — COLCHICINE 0.6 MG/1
TABLET ORAL
Refills: 0 | Status: ACTIVE | COMMUNITY

## 2023-10-04 RX ORDER — METHYLPREDNISOLONE 4 MG/1
4 TABLET ORAL
Qty: 1 | Refills: 0 | Status: DISCONTINUED | COMMUNITY
Start: 2023-03-29 | End: 2023-10-04

## 2023-10-04 RX ORDER — OFLOXACIN OTIC 3 MG/ML
0.3 SOLUTION AURICULAR (OTIC)
Qty: 1 | Refills: 1 | Status: DISCONTINUED | COMMUNITY
Start: 2023-03-17 | End: 2023-10-04

## 2023-10-04 RX ORDER — OMEPRAZOLE 40 MG/1
40 CAPSULE, DELAYED RELEASE ORAL
Qty: 14 | Refills: 0 | Status: DISCONTINUED | COMMUNITY
Start: 2023-09-14 | End: 2023-10-04

## 2023-10-04 RX ORDER — OMEPRAZOLE 40 MG/1
40 CAPSULE, DELAYED RELEASE ORAL
Qty: 7 | Refills: 0 | Status: DISCONTINUED | COMMUNITY
Start: 2023-03-29 | End: 2023-10-04

## 2023-10-04 RX ORDER — OFLOXACIN OTIC 3 MG/ML
0.3 SOLUTION AURICULAR (OTIC)
Qty: 1 | Refills: 1 | Status: DISCONTINUED | COMMUNITY
Start: 2023-03-06 | End: 2023-10-04

## 2023-10-04 RX ORDER — OFLOXACIN OTIC 3 MG/ML
0.3 SOLUTION AURICULAR (OTIC)
Qty: 1 | Refills: 1 | Status: DISCONTINUED | COMMUNITY
Start: 2023-09-27 | End: 2023-10-04

## 2023-10-04 RX ORDER — NORETHINDRONE ACETATE AND ETHINYL ESTRADIOL AND FERROUS FUMARATE 1MG-20(24)
KIT ORAL
Refills: 0 | Status: ACTIVE | COMMUNITY

## 2023-10-04 RX ORDER — LEVOTHYROXINE SODIUM 100 UG/1
100 CAPSULE ORAL
Refills: 0 | Status: ACTIVE | COMMUNITY

## 2023-10-04 RX ORDER — PREDNISONE 10 MG/1
10 TABLET ORAL
Qty: 45 | Refills: 0 | Status: DISCONTINUED | COMMUNITY
Start: 2023-03-23 | End: 2023-10-04

## 2023-10-04 RX ORDER — PREDNISONE 10 MG/1
10 TABLET ORAL
Qty: 67 | Refills: 0 | Status: DISCONTINUED | COMMUNITY
Start: 2023-09-14 | End: 2023-10-04

## 2023-10-04 RX ORDER — TRIAMTERENE AND HYDROCHLOROTHIAZIDE 25; 37.5 MG/1; MG/1
37.5-25 TABLET ORAL
Qty: 90 | Refills: 0 | Status: DISCONTINUED | COMMUNITY
Start: 2023-02-08 | End: 2023-10-04

## 2023-10-04 RX ORDER — OMEPRAZOLE 40 MG/1
40 CAPSULE, DELAYED RELEASE ORAL
Qty: 14 | Refills: 0 | Status: DISCONTINUED | COMMUNITY
Start: 2023-09-05 | End: 2023-10-04

## 2023-10-04 RX ORDER — PREDNISONE 10 MG/1
10 TABLET ORAL
Qty: 45 | Refills: 0 | Status: DISCONTINUED | COMMUNITY
Start: 2023-09-05 | End: 2023-10-04

## 2023-10-04 RX ORDER — OMEPRAZOLE 40 MG/1
40 CAPSULE, DELAYED RELEASE ORAL
Qty: 14 | Refills: 0 | Status: DISCONTINUED | COMMUNITY
Start: 2023-03-23 | End: 2023-10-04

## 2023-10-04 RX ORDER — DIAZEPAM 5 MG/1
5 TABLET ORAL
Qty: 2 | Refills: 0 | Status: DISCONTINUED | COMMUNITY
Start: 2023-02-23 | End: 2023-10-04

## 2023-10-04 RX ORDER — OFLOXACIN OTIC 3 MG/ML
0.3 SOLUTION AURICULAR (OTIC)
Qty: 1 | Refills: 1 | Status: DISCONTINUED | COMMUNITY
Start: 2023-09-05 | End: 2023-10-04

## 2023-10-04 RX ORDER — OMEPRAZOLE 40 MG/1
40 CAPSULE, DELAYED RELEASE ORAL
Qty: 14 | Refills: 0 | Status: DISCONTINUED | COMMUNITY
Start: 2023-02-23 | End: 2023-10-04

## 2023-10-04 RX ORDER — PREDNISONE 10 MG/1
10 TABLET ORAL
Qty: 45 | Refills: 0 | Status: DISCONTINUED | COMMUNITY
Start: 2023-02-23 | End: 2023-10-04

## 2023-10-05 PROBLEM — H90.3 ASYMMETRICAL SENSORINEURAL HEARING LOSS: Status: ACTIVE | Noted: 2023-10-05

## 2023-10-05 PROBLEM — H91.22 SUDDEN HEARING LOSS, LEFT: Status: ACTIVE | Noted: 2023-09-14

## 2023-10-10 ENCOUNTER — APPOINTMENT (OUTPATIENT)
Dept: OTOLARYNGOLOGY | Facility: CLINIC | Age: 53
End: 2023-10-10

## 2024-02-14 ENCOUNTER — RX RENEWAL (OUTPATIENT)
Age: 54
End: 2024-02-14

## 2024-02-14 RX ORDER — TRIAMTERENE AND HYDROCHLOROTHIAZIDE 25; 37.5 MG/1; MG/1
37.5-25 TABLET ORAL DAILY
Qty: 15 | Refills: 0 | Status: ACTIVE | COMMUNITY
Start: 2023-08-17 | End: 1900-01-01

## 2024-03-14 ENCOUNTER — NON-APPOINTMENT (OUTPATIENT)
Age: 54
End: 2024-03-14

## 2024-03-18 ENCOUNTER — NON-APPOINTMENT (OUTPATIENT)
Age: 54
End: 2024-03-18

## 2024-03-19 ENCOUNTER — RX RENEWAL (OUTPATIENT)
Age: 54
End: 2024-03-19

## 2024-04-11 ENCOUNTER — APPOINTMENT (OUTPATIENT)
Dept: OTOLARYNGOLOGY | Facility: CLINIC | Age: 54
End: 2024-04-11
Payer: COMMERCIAL

## 2024-04-11 VITALS
WEIGHT: 135 LBS | HEIGHT: 65 IN | SYSTOLIC BLOOD PRESSURE: 125 MMHG | BODY MASS INDEX: 22.49 KG/M2 | DIASTOLIC BLOOD PRESSURE: 74 MMHG | OXYGEN SATURATION: 99 % | TEMPERATURE: 98 F | HEART RATE: 78 BPM

## 2024-04-11 DIAGNOSIS — H81.03 MENIERE'S DISEASE, BILATERAL: ICD-10-CM

## 2024-04-11 DIAGNOSIS — H61.22 IMPACTED CERUMEN, LEFT EAR: ICD-10-CM

## 2024-04-11 DIAGNOSIS — R42 DIZZINESS AND GIDDINESS: ICD-10-CM

## 2024-04-11 DIAGNOSIS — H90.A21 SENSORINEURAL HEARING LOSS, UNILATERAL, RIGHT EAR, WITH RESTRICTED HEARING ON THE CONTRALATERAL SIDE: ICD-10-CM

## 2024-04-11 DIAGNOSIS — H83.8X3 OTHER SPECIFIED DISEASES OF INNER EAR, BILATERAL: ICD-10-CM

## 2024-04-11 PROCEDURE — 92550 TYMPANOMETRY & REFLEX THRESH: CPT | Mod: 52

## 2024-04-11 PROCEDURE — 69210 REMOVE IMPACTED EAR WAX UNI: CPT

## 2024-04-11 PROCEDURE — 99214 OFFICE O/P EST MOD 30 MIN: CPT | Mod: 25

## 2024-04-11 PROCEDURE — 92557 COMPREHENSIVE HEARING TEST: CPT

## 2024-04-11 RX ORDER — TRIAMTERENE AND HYDROCHLOROTHIAZIDE 25; 37.5 MG/1; MG/1
37.5-25 TABLET ORAL
Qty: 45 | Refills: 1 | Status: ACTIVE | COMMUNITY
Start: 2024-04-04 | End: 1900-01-01

## 2024-04-11 NOTE — PHYSICAL EXAM
[Normal] : external ears are normal bilaterally [de-identified] : r nl, l copious cerumen removed atraumatically with suction, b TMs nl  [de-identified] : gait steady

## 2024-04-11 NOTE — PROCEDURE
[Cerumen Impaction] : Cerumen Impaction [Same] : same as the Pre Op Dx. [] : Removal of Cerumen [FreeTextEntry5] : r nl, l copious cerumen removed atraumatically with suction, b TMs nl  no distress/obese

## 2024-04-11 NOTE — HISTORY OF PRESENT ILLNESS
[de-identified] : 6 month follow up visit for this 55 y/o F with h/o b fluctuating hl most c/w aied (HSP-70 negative) has some similarities to Meniere's b. She is following a lo sodium diet, on diuretic, and colchicine per Dr. Garcia. She had stopped taking diuretic for 2 weeks approximately 1 month ago and developed fullness in her right ear. She restarted diuretic and fullness is improved. She saw Dr. Hutchison and was prescribed Beta-Histine and dizziness has improved and is less frequent and fullness in left ear has improved.

## 2024-04-11 NOTE — ASSESSMENT
[FreeTextEntry1] : 1. b likely aied, behaves as Meniere's - showed r asymmetric snhl - l lf improved compared with 23 -results reviewed with pt  -continue lo sodium diet- 1500 mg, hydration, diuretic- renewed -continue Colchicine per Dr. Garcia -continue Beta Histine per Dr. Hutchison 2. l cerumen impaction  -cerumen removed -ears felt better RTC in 3 months with rpt ; call sooner for any issues

## 2024-04-11 NOTE — REASON FOR VISIT
[Subsequent Evaluation] : a subsequent evaluation for [FreeTextEntry2] : r sudden hearing loss, l sudden hearing loss

## 2024-04-11 NOTE — DATA REVIEWED
[de-identified] :  showed r asymmetric snhl - l lf improved compared with 23 -results reviewed with pt

## 2024-12-18 ENCOUNTER — RX RENEWAL (OUTPATIENT)
Age: 54
End: 2024-12-18

## 2025-03-12 ENCOUNTER — NON-APPOINTMENT (OUTPATIENT)
Age: 55
End: 2025-03-12

## 2025-03-13 ENCOUNTER — RX RENEWAL (OUTPATIENT)
Age: 55
End: 2025-03-13

## 2025-03-19 ENCOUNTER — APPOINTMENT (OUTPATIENT)
Dept: OTOLARYNGOLOGY | Facility: CLINIC | Age: 55
End: 2025-03-19
Payer: COMMERCIAL

## 2025-03-19 VITALS
OXYGEN SATURATION: 99 % | BODY MASS INDEX: 22.49 KG/M2 | HEIGHT: 65 IN | TEMPERATURE: 97.3 F | HEART RATE: 63 BPM | WEIGHT: 135 LBS | SYSTOLIC BLOOD PRESSURE: 107 MMHG | DIASTOLIC BLOOD PRESSURE: 73 MMHG

## 2025-03-19 DIAGNOSIS — H81.03 MENIERE'S DISEASE, BILATERAL: ICD-10-CM

## 2025-03-19 DIAGNOSIS — H83.8X3 OTHER SPECIFIED DISEASES OF INNER EAR, BILATERAL: ICD-10-CM

## 2025-03-19 PROCEDURE — 99213 OFFICE O/P EST LOW 20 MIN: CPT

## 2025-05-05 ENCOUNTER — NON-APPOINTMENT (OUTPATIENT)
Age: 55
End: 2025-05-05

## 2025-05-05 ENCOUNTER — APPOINTMENT (OUTPATIENT)
Dept: OTOLARYNGOLOGY | Facility: CLINIC | Age: 55
End: 2025-05-05
Payer: COMMERCIAL

## 2025-05-05 VITALS
DIASTOLIC BLOOD PRESSURE: 73 MMHG | HEIGHT: 65 IN | WEIGHT: 135 LBS | SYSTOLIC BLOOD PRESSURE: 112 MMHG | BODY MASS INDEX: 22.49 KG/M2 | HEART RATE: 70 BPM | OXYGEN SATURATION: 99 % | TEMPERATURE: 97.3 F

## 2025-05-05 DIAGNOSIS — H90.A21 SENSORINEURAL HEARING LOSS, UNILATERAL, RIGHT EAR, WITH RESTRICTED HEARING ON THE CONTRALATERAL SIDE: ICD-10-CM

## 2025-05-05 DIAGNOSIS — H83.8X3 OTHER SPECIFIED DISEASES OF INNER EAR, BILATERAL: ICD-10-CM

## 2025-05-05 DIAGNOSIS — H81.03 MENIERE'S DISEASE, BILATERAL: ICD-10-CM

## 2025-05-05 PROCEDURE — 92557 COMPREHENSIVE HEARING TEST: CPT

## 2025-05-05 PROCEDURE — 99213 OFFICE O/P EST LOW 20 MIN: CPT

## 2025-05-05 PROCEDURE — 92567 TYMPANOMETRY: CPT

## 2025-06-13 ENCOUNTER — NON-APPOINTMENT (OUTPATIENT)
Age: 55
End: 2025-06-13

## 2025-06-16 ENCOUNTER — NON-APPOINTMENT (OUTPATIENT)
Age: 55
End: 2025-06-16

## 2025-06-18 ENCOUNTER — RX RENEWAL (OUTPATIENT)
Age: 55
End: 2025-06-18

## 2025-09-15 ENCOUNTER — RX RENEWAL (OUTPATIENT)
Age: 55
End: 2025-09-15